# Patient Record
Sex: FEMALE | URBAN - METROPOLITAN AREA
[De-identification: names, ages, dates, MRNs, and addresses within clinical notes are randomized per-mention and may not be internally consistent; named-entity substitution may affect disease eponyms.]

---

## 2021-11-23 ENCOUNTER — OFFICE (OUTPATIENT)
Dept: URBAN - METROPOLITAN AREA CLINIC 75 | Facility: CLINIC | Age: 51
End: 2021-11-23
Payer: COMMERCIAL

## 2021-11-23 VITALS
HEART RATE: 95 BPM | DIASTOLIC BLOOD PRESSURE: 88 MMHG | HEIGHT: 64 IN | SYSTOLIC BLOOD PRESSURE: 130 MMHG | OXYGEN SATURATION: 95 % | WEIGHT: 180 LBS

## 2021-11-23 DIAGNOSIS — R19.7 DIARRHEA, UNSPECIFIED: ICD-10-CM

## 2021-11-23 DIAGNOSIS — K59.00 CONSTIPATION, UNSPECIFIED: ICD-10-CM

## 2021-11-23 DIAGNOSIS — R10.31 RIGHT LOWER QUADRANT PAIN: ICD-10-CM

## 2021-11-23 PROCEDURE — 99204 OFFICE O/P NEW MOD 45 MIN: CPT | Performed by: INTERNAL MEDICINE

## 2023-08-16 ENCOUNTER — OFFICE VISIT (OUTPATIENT)
Dept: OBSTETRICS AND GYNECOLOGY | Age: 53
End: 2023-08-16
Payer: COMMERCIAL

## 2023-08-16 VITALS
BODY MASS INDEX: 23.9 KG/M2 | DIASTOLIC BLOOD PRESSURE: 74 MMHG | SYSTOLIC BLOOD PRESSURE: 125 MMHG | HEIGHT: 64 IN | WEIGHT: 140 LBS

## 2023-08-16 DIAGNOSIS — D21.9 FIBROIDS: ICD-10-CM

## 2023-08-16 DIAGNOSIS — Z11.51 SCREENING FOR HUMAN PAPILLOMAVIRUS (HPV): ICD-10-CM

## 2023-08-16 DIAGNOSIS — Z12.4 SCREENING FOR MALIGNANT NEOPLASM OF THE CERVIX: ICD-10-CM

## 2023-08-16 DIAGNOSIS — E11.9 TYPE 2 DIABETES MELLITUS WITHOUT COMPLICATION, WITHOUT LONG-TERM CURRENT USE OF INSULIN: ICD-10-CM

## 2023-08-16 DIAGNOSIS — N94.89 ADNEXAL MASS: ICD-10-CM

## 2023-08-16 DIAGNOSIS — N93.9 ABNORMAL UTERINE BLEEDING (AUB): ICD-10-CM

## 2023-08-16 DIAGNOSIS — Z13.89 SCREENING FOR BLOOD OR PROTEIN IN URINE: Primary | ICD-10-CM

## 2023-08-16 LAB
B-HCG UR QL: NEGATIVE
BILIRUB BLD-MCNC: ABNORMAL MG/DL
EXPIRATION DATE: NORMAL
GLUCOSE UR STRIP-MCNC: ABNORMAL MG/DL
INTERNAL NEGATIVE CONTROL: NEGATIVE
INTERNAL POSITIVE CONTROL: NORMAL
KETONES UR QL: ABNORMAL
LEUKOCYTE EST, POC: ABNORMAL
Lab: NORMAL
NITRITE UR-MCNC: NEGATIVE MG/ML
PH UR: 5.5 [PH] (ref 5–8)
PROT UR STRIP-MCNC: ABNORMAL MG/DL
RBC # UR STRIP: NEGATIVE /UL
SP GR UR: 1.02 (ref 1–1.03)
UROBILINOGEN UR QL: ABNORMAL

## 2023-08-16 RX ORDER — TRIAMTERENE AND HYDROCHLOROTHIAZIDE 37.5; 25 MG/1; MG/1
1 CAPSULE ORAL EVERY MORNING
COMMUNITY

## 2023-08-16 RX ORDER — DULOXETIN HYDROCHLORIDE 20 MG/1
20 CAPSULE, DELAYED RELEASE ORAL DAILY
COMMUNITY

## 2023-08-16 RX ORDER — ROSUVASTATIN CALCIUM 10 MG/1
1 TABLET, COATED ORAL DAILY
COMMUNITY
Start: 2023-05-30

## 2023-08-16 RX ORDER — INSULIN GLARGINE 100 [IU]/ML
INJECTION, SOLUTION SUBCUTANEOUS DAILY
COMMUNITY

## 2023-08-16 RX ORDER — DEXAMETHASONE 4 MG/1
1 TABLET ORAL DAILY
COMMUNITY

## 2023-08-16 RX ORDER — HYDROXYZINE PAMOATE 50 MG/1
CAPSULE ORAL
COMMUNITY
Start: 2023-08-01

## 2023-08-16 RX ORDER — GABAPENTIN 100 MG/1
CAPSULE ORAL
COMMUNITY
Start: 2023-07-31

## 2023-08-16 NOTE — PROGRESS NOTES
New GYN Problem    Chief Complaint   Patient presents with    Gynecologic Exam     Cc:  new pt here for  tvus - fibroids   Today having very painful  cramps , stopped bleeding 10 days ago , patient says when she was bleeding past few months bleeding was very heavy with large blood clots    Pt is homeless , she is due for pap and mammogram        Trini Shaw is a 53-year-old G0 who presents with a complaint of postmenopausal bleeding.  She notes that she has had bleeding, passage of what appears to be tissue or fibroids intermittently for the past year.  She entered into menopause around age 48 or 49.  She does have some cramping and occasionally the bleeding is quite heavy.  It has been a few years since she has had Pap or mammogram.  She has had some significant weight loss this past year.  She had severe COVID and thereafter depression which has decreased her appetite.    Histories  Past Medical History:   Diagnosis Date    Anemia     Anxiety     Depression     Diabetes     Fibroid 09/2022    Hyperlipidemia     Hypertension     Kidney stone     PMS (premenstrual syndrome)     Restless leg syndrome     Trauma 03/2022    Vitamin D deficiency        Past Surgical History:   Procedure Laterality Date    CHOLECYSTECTOMY      LAPAROSCOPIC CHOLECYSTECTOMY         Family History   Problem Relation Age of Onset    Heart disease Mother     Hypertension Mother     Diabetes Mother     Depression Mother     Anxiety disorder Mother     Heart disease Father     Hypertension Father     Diabetes Father     Anxiety disorder Father     Heart disease Maternal Grandmother     Hypertension Maternal Grandmother     Depression Maternal Grandmother     Anxiety disorder Maternal Grandmother     Heart disease Maternal Grandfather     Hypertension Maternal Grandfather     Depression Maternal Grandfather     Anxiety disorder Maternal Grandfather     Heart disease Paternal Grandmother     Hypertension Paternal Grandmother     Heart disease  "Paternal Grandfather     Hypertension Paternal Grandfather        Social History     Socioeconomic History    Marital status:    Tobacco Use    Smoking status: Every Day     Packs/day: 0.25     Years: 15.00     Pack years: 3.75     Types: Cigarettes    Smokeless tobacco: Never   Substance and Sexual Activity    Alcohol use: Not Currently     Comment: occasional    Drug use: No    Sexual activity: Not Currently     Partners: Male     Birth control/protection: Condom       OB History          0    Para   0    Term   0       0    AB   0    Living   0         SAB   0    IAB   0    Ectopic   0    Molar   0    Multiple   0    Live Births   0                Physical Exam    /74   Ht 162.6 cm (64\")   Wt 63.5 kg (140 lb)   LMP 2023 (Approximate)   BMI 24.03 kg/mý     BMI: Body mass index is 24.03 kg/mý.     General:   alert, appears stated age, cooperative, and occasionally tearful and anxious appearing   Neck Nontender, no lymphadenopathy, no thyromegaly   Lung lungs clear to auscultation, no wheezes or rhonchi   Heart: heart regular rate and rhythm, no murmurs   Abdomen: soft, non-tender, without masses or organomegaly   Breast: inspection negative, no nipple discharge or bleeding, no masses or nodularity palpable   Urethra and bladder: urethral meatus normal; bladder nontender to palpation;   Vulva: normal, Bartholin's, Urethra, [place]'s normal   Vagina: normal mucosa, normal discharge, scant blood   Cervix: no lesions and nulliparous appearance   Uterus: normal size, non-tender, and anteverted   Adnexa: normal adnexa and no mass, fullness, tenderness       TVUS with homogenously thickened endometrium  Right ovary is overall small but has a slightly heterogenous area  The left ovary appears normal and atrophic but is surrounded by a dilated fluid-filled structure, possibly hydrosalpinx    Informed consent was obtained and risks described as bleeding, cramping, uterine perforation, " infection. She desired to proceed. A speculum was inserted and the cervix visualized and cleansed with betadine. A single tooth tenaculum was used to grasp the cervix. The pipelle was inserted and the plunger pulled back. The pipelle was rotated and withdrawn from the uterus with return of moderate amount of endometrial tissue. A total of three passes were made with the pipelle. The tenaculum was then removed and hemostasis of the sites ensured. The patient tolerated the procedure well. Tissue recovered was sent for pathologic examination.      Assessment/Plan    Diagnoses and all orders for this visit:    1. Screening for blood or protein in urine (Primary)  -     POC Urinalysis Dipstick    2. Fibroids  -     US Non-ob Transvaginal  -     Reference Histopathology    3. Adnexal mass  -       -     CEA  -     Cancer Antigen 19-9  -     Estradiol  -     Reference Histopathology    4. Type 2 diabetes mellitus without complication, without long-term current use of insulin  -     Hemoglobin A1c  -     Comprehensive Metabolic Panel    5. Abnormal uterine bleeding (AUB)  -     CBC (No Diff)  -     Ferritin  -     POC Pregnancy, Urine  -     IGP, Aptima HPV, Rfx 16 / 18,45  -     Reference Histopathology    6. Screening for malignant neoplasm of the cervix  -     IGP, Aptima HPV, Rfx 16 / 18,45    7. Screening for human papillomavirus (HPV)  -     IGP, Aptima HPV, Rfx 16 / 18,45      She has had postmenopausal bleeding for about a year and has a thickened endometrium.  Endometrial biopsy was obtained today for evaluation.  Also she has heterogenous area of the right ovary and what appears to be likely hydrosalpinx on the left.  Tumor markers obtained today.    Follow up in three weeks, will call earlier with results.      Neva Ramesh MD  08/16/2023  15:46 EDT

## 2023-08-17 LAB
ALBUMIN SERPL-MCNC: 4.6 G/DL (ref 3.5–5.2)
ALBUMIN/GLOB SERPL: 2.1 G/DL
ALP SERPL-CCNC: 91 U/L (ref 39–117)
ALT SERPL-CCNC: 6 U/L (ref 1–33)
AST SERPL-CCNC: <5 U/L (ref 1–32)
BILIRUB SERPL-MCNC: 0.4 MG/DL (ref 0–1.2)
BUN SERPL-MCNC: 25 MG/DL (ref 6–20)
BUN/CREAT SERPL: 29.8 (ref 7–25)
CALCIUM SERPL-MCNC: 10.1 MG/DL (ref 8.6–10.5)
CANCER AG125 SERPL-ACNC: 34.4 U/ML (ref 0–38.1)
CANCER AG19-9 SERPL-ACNC: 77 U/ML (ref 0–35)
CEA SERPL-MCNC: 6.3 NG/ML (ref 0–4.7)
CHLORIDE SERPL-SCNC: 97 MMOL/L (ref 98–107)
CO2 SERPL-SCNC: 21.1 MMOL/L (ref 22–29)
CREAT SERPL-MCNC: 0.84 MG/DL (ref 0.57–1)
EGFRCR SERPLBLD CKD-EPI 2021: 82.2 ML/MIN/1.73
ERYTHROCYTE [DISTWIDTH] IN BLOOD BY AUTOMATED COUNT: 12.9 % (ref 12.3–15.4)
ESTRADIOL SERPL-MCNC: 7.7 PG/ML
FERRITIN SERPL-MCNC: 63.9 NG/ML (ref 13–150)
GLOBULIN SER CALC-MCNC: 2.2 GM/DL
GLUCOSE SERPL-MCNC: 295 MG/DL (ref 65–99)
HBA1C MFR BLD: 12.5 % (ref 4.8–5.6)
HCT VFR BLD AUTO: 38.1 % (ref 34–46.6)
HGB BLD-MCNC: 12.6 G/DL (ref 12–15.9)
MCH RBC QN AUTO: 27.3 PG (ref 26.6–33)
MCHC RBC AUTO-ENTMCNC: 33.1 G/DL (ref 31.5–35.7)
MCV RBC AUTO: 82.5 FL (ref 79–97)
PLATELET # BLD AUTO: 533 10*3/MM3 (ref 140–450)
POTASSIUM SERPL-SCNC: 4.8 MMOL/L (ref 3.5–5.2)
PROT SERPL-MCNC: 6.8 G/DL (ref 6–8.5)
RBC # BLD AUTO: 4.62 10*6/MM3 (ref 3.77–5.28)
SODIUM SERPL-SCNC: 137 MMOL/L (ref 136–145)
WBC # BLD AUTO: 7.81 10*3/MM3 (ref 3.4–10.8)

## 2023-08-21 LAB
CYTOLOGIST CVX/VAG CYTO: NORMAL
CYTOLOGY CVX/VAG DOC CYTO: NORMAL
CYTOLOGY CVX/VAG DOC THIN PREP: NORMAL
DX ICD CODE: NORMAL
HIV 1 & 2 AB SER-IMP: NORMAL
HPV GENOTYPE REFLEX: NORMAL
HPV I/H RISK 4 DNA CVX QL PROBE+SIG AMP: NEGATIVE
OTHER STN SPEC: NORMAL
PATHOLOGIST CVX/VAG CYTO: NORMAL
STAT OF ADQ CVX/VAG CYTO-IMP: NORMAL

## 2023-08-24 ENCOUNTER — TELEPHONE (OUTPATIENT)
Dept: OBSTETRICS AND GYNECOLOGY | Age: 53
End: 2023-08-24
Payer: COMMERCIAL

## 2023-08-24 DIAGNOSIS — N95.0 PMB (POSTMENOPAUSAL BLEEDING): ICD-10-CM

## 2023-08-24 DIAGNOSIS — N94.89 ADNEXAL MASS: ICD-10-CM

## 2023-08-24 DIAGNOSIS — R97.0 ELEVATED CEA: ICD-10-CM

## 2023-08-24 DIAGNOSIS — C54.1 ENDOMETRIAL CANCER: ICD-10-CM

## 2023-08-24 DIAGNOSIS — R10.2 PELVIC PAIN: Primary | ICD-10-CM

## 2023-08-24 RX ORDER — TRAMADOL HYDROCHLORIDE 50 MG/1
50 TABLET ORAL EVERY 6 HOURS PRN
Qty: 12 TABLET | Refills: 0 | Status: SHIPPED | OUTPATIENT
Start: 2023-08-24

## 2023-08-24 NOTE — TELEPHONE ENCOUNTER
Dr. Ramesh,    Pt has questions about disability she is trying to apply for, and a letter she needs from you if you can call her when you are able.     Thanks,    Loni

## 2023-08-24 NOTE — TELEPHONE ENCOUNTER
Pt called stating she is wanting to talk about her cancer blood work results that cam up in her mychart. Patient also states that her cramping in severe and tylenol is no longer working. Pt states she is currently living in a homeless shelter and her resources are very limited, but she does have insurance. Please advise.

## 2023-08-24 NOTE — PROGRESS NOTES
Reviewed results with her. The most important piece of the puzzle is still pending, called [facility] this morning and confirmed that more stains were necessary for the endometrial biopsy and so it is still pending. Highly suspect endometrial cancer but also other tumor markers are elevated suggesting a GI cancer.   Referral made to GYN oncology Dr. Conteh at [facility], [place] for further evaluation  She notes increased pelvic pain since bx not relieved with tylenol, ibuprofen and I sent in limited rx for tramadol after reviewing SWATHI.    Will call when results from EMB available    Neva Ramesh MD  8/24/2023  10:08 EDT

## 2023-08-24 NOTE — TELEPHONE ENCOUNTER
Let her know, I would not be handling any of her disability paperwork    Neva Ramesh MD  8/24/2023.  12:12 EDT

## 2023-08-28 LAB
DX ICD CODE: NORMAL
PATH REPORT.FINAL DX SPEC: NORMAL
PATH REPORT.GROSS SPEC: NORMAL
PATH REPORT.SITE OF ORIGIN SPEC: NORMAL
PATHOLOGIST NAME: NORMAL
PAYMENT PROCEDURE: NORMAL

## 2023-08-28 NOTE — PROGRESS NOTES
Attempted to call patient, unable to reach and voicemail box was full. Will try to call again tomorrow  As suspected, you do have an endometrial cancer.  Please follow-up with Dr. Conteh for treatment    Please also fax results to Dr. Conteh's office

## 2023-09-05 ENCOUNTER — TELEPHONE (OUTPATIENT)
Dept: OBSTETRICS AND GYNECOLOGY | Age: 53
End: 2023-09-05
Payer: COMMERCIAL

## 2023-09-05 NOTE — TELEPHONE ENCOUNTER
Called pt back, she is crying and sounds to be in pain. Discussed that if she is having so much pain would recommend going to the ED at [facility] where gyn oncology is available. She is agreeable    Neva Ramesh MD  9/5/2023  12:19 EDT